# Patient Record
Sex: MALE | Race: WHITE | NOT HISPANIC OR LATINO | Employment: OTHER | URBAN - METROPOLITAN AREA
[De-identification: names, ages, dates, MRNs, and addresses within clinical notes are randomized per-mention and may not be internally consistent; named-entity substitution may affect disease eponyms.]

---

## 2021-03-20 ENCOUNTER — OFFICE VISIT (OUTPATIENT)
Dept: URGENT CARE | Facility: CLINIC | Age: 26
End: 2021-03-20
Payer: OTHER GOVERNMENT

## 2021-03-20 VITALS
TEMPERATURE: 98 F | DIASTOLIC BLOOD PRESSURE: 92 MMHG | WEIGHT: 165 LBS | BODY MASS INDEX: 25.01 KG/M2 | HEART RATE: 97 BPM | OXYGEN SATURATION: 100 % | SYSTOLIC BLOOD PRESSURE: 135 MMHG | RESPIRATION RATE: 16 BRPM | HEIGHT: 68 IN

## 2021-03-20 DIAGNOSIS — T15.91XA FB EYE, RIGHT, INITIAL ENCOUNTER: Primary | ICD-10-CM

## 2021-03-20 PROCEDURE — 99203 OFFICE O/P NEW LOW 30 MIN: CPT | Mod: S$GLB,,, | Performed by: PHYSICIAN ASSISTANT

## 2021-03-20 PROCEDURE — 99203 PR OFFICE/OUTPT VISIT, NEW, LEVL III, 30-44 MIN: ICD-10-PCS | Mod: S$GLB,,, | Performed by: PHYSICIAN ASSISTANT

## 2022-05-26 ENCOUNTER — OFFICE VISIT (OUTPATIENT)
Dept: FAMILY MEDICINE | Facility: CLINIC | Age: 27
End: 2022-05-26
Payer: OTHER GOVERNMENT

## 2022-05-26 VITALS
BODY MASS INDEX: 26.4 KG/M2 | HEART RATE: 88 BPM | WEIGHT: 174.19 LBS | DIASTOLIC BLOOD PRESSURE: 72 MMHG | SYSTOLIC BLOOD PRESSURE: 118 MMHG | OXYGEN SATURATION: 96 % | HEIGHT: 68 IN

## 2022-05-26 DIAGNOSIS — Z00.00 ANNUAL PHYSICAL EXAM: Primary | ICD-10-CM

## 2022-05-26 DIAGNOSIS — Z11.1 SCREENING-PULMONARY TB: ICD-10-CM

## 2022-05-26 PROCEDURE — 99999 PR PBB SHADOW E&M-EST. PATIENT-LVL III: CPT | Mod: PBBFAC,,, | Performed by: FAMILY MEDICINE

## 2022-05-26 PROCEDURE — 99213 OFFICE O/P EST LOW 20 MIN: CPT | Mod: PBBFAC,PN | Performed by: FAMILY MEDICINE

## 2022-05-26 PROCEDURE — 99385 PR PREVENTIVE VISIT,NEW,18-39: ICD-10-PCS | Mod: S$PBB,,, | Performed by: FAMILY MEDICINE

## 2022-05-26 PROCEDURE — 99999 PR PBB SHADOW E&M-EST. PATIENT-LVL III: ICD-10-PCS | Mod: PBBFAC,,, | Performed by: FAMILY MEDICINE

## 2022-05-26 PROCEDURE — 99385 PREV VISIT NEW AGE 18-39: CPT | Mod: S$PBB,,, | Performed by: FAMILY MEDICINE

## 2022-05-26 NOTE — PROGRESS NOTES
"EST PATIENT VISIT FAMILY MEDICINE    CC:   Chief Complaint   Patient presents with    Annual Exam     TB test       HPI: Amadeo Friedman  is a 26 y.o. male:    Patient is a 26 y.o. yo gentleman here for annual well visit.    Acute concerns: none  Employment: nursing school  Living situation: with others; parents  Pets: dog(s)  Sunscreen use: yes   Chronic health conditions: none    Hx of STI: no  STI screening: declines     Depressed mood: no    Family hx of breast, colon and/or ovarian cancer: no    ROS: Review of Systems   Constitutional: Negative for fever.   Respiratory: Negative for shortness of breath.    Cardiovascular: Negative for chest pain.       PMHX: History reviewed. No pertinent past medical history.    PSHX: History reviewed. No pertinent surgical history.    FAMHX: History reviewed. No pertinent family history.    SOCHX:   Social History     Socioeconomic History    Marital status:    Tobacco Use    Smoking status: Former Smoker    Smokeless tobacco: Former User   Substance and Sexual Activity    Alcohol use: Not Currently    Drug use: Not Currently       ALLERGIES: Review of patient's allergies indicates:  No Known Allergies    MEDS: No current outpatient medications on file.    OBJECTIVE:   Vitals:    05/26/22 0942   BP: 118/72   BP Location: Left arm   Patient Position: Sitting   BP Method: Large (Manual)   Pulse: 88   SpO2: 96%   Weight: 79 kg (174 lb 2.6 oz)   Height: 5' 8" (1.727 m)     Body mass index is 26.48 kg/m².      Physical Exam  Vitals and nursing note reviewed.   Constitutional:       Appearance: Normal appearance.   HENT:      Head: Normocephalic.   Eyes:      General:         Right eye: No discharge.         Left eye: No discharge.      Extraocular Movements: Extraocular movements intact.   Cardiovascular:      Rate and Rhythm: Normal rate and regular rhythm.      Heart sounds: Normal heart sounds.   Pulmonary:      Effort: Pulmonary effort is normal.      Breath " sounds: Normal breath sounds.   Skin:     Comments: No obvious rash on exposed skin   Neurological:      Mental Status: He is alert.   Psychiatric:         Behavior: Behavior normal.       ASSESSMENT & PLAN:    Problem List Items Addressed This Visit    None     Visit Diagnoses     Annual physical exam    -  Primary    Relevant Orders    CBC Auto Differential    Comprehensive Metabolic Panel    Lipid Panel    Screening-pulmonary TB        Relevant Orders    Quantiferon Gold TB        Preventative cares discussed and updated as needed. Lifestyle modifications discussed as needed.    Cleared to participate in clinicals     Follow up if symptoms worsen or fail to improve.      RTC/ED precautions discussed where applicable.   Encouraged patient to let me know if there are any further questions/concerns.     Advise patient/caretaker to check with insurance regarding orders to avoid unexpected fees/costs.     The patient/caretaker indicates understanding of these issues and agrees with the plan.    Dr. Binta Uribe MD  Family Medicine

## 2022-05-27 ENCOUNTER — TELEPHONE (OUTPATIENT)
Dept: FAMILY MEDICINE | Facility: CLINIC | Age: 27
End: 2022-05-27

## 2022-05-27 ENCOUNTER — PATIENT MESSAGE (OUTPATIENT)
Dept: FAMILY MEDICINE | Facility: CLINIC | Age: 27
End: 2022-05-27
Payer: OTHER GOVERNMENT

## 2022-05-27 DIAGNOSIS — Z00.00 ANNUAL PHYSICAL EXAM: Primary | ICD-10-CM

## 2022-05-27 NOTE — TELEPHONE ENCOUNTER
----- Message from Emilee Rowe sent at 5/27/2022 11:39 AM CDT -----  Regarding: Chicken Pox Vax  Type:  Needs Medical Advice    Who Called: pt  Would the patient rather a call back or a response via [facility]? call  Best Call Back Number: [phone]  Additional Information: vaccine order        82

## 2022-05-31 ENCOUNTER — PATIENT MESSAGE (OUTPATIENT)
Dept: FAMILY MEDICINE | Facility: CLINIC | Age: 27
End: 2022-05-31
Payer: OTHER GOVERNMENT

## 2022-05-31 RX ORDER — ZOSTER VACCINE RECOMBINANT, ADJUVANTED 50 MCG/0.5
KIT INTRAMUSCULAR
Qty: 1 EACH | Refills: 0 | Status: SHIPPED | OUTPATIENT
Start: 2022-05-31

## 2022-06-08 ENCOUNTER — PATIENT MESSAGE (OUTPATIENT)
Dept: FAMILY MEDICINE | Facility: CLINIC | Age: 27
End: 2022-06-08
Payer: OTHER GOVERNMENT

## 2022-06-08 DIAGNOSIS — E78.1 HYPERTRIGLYCERIDEMIA: Primary | ICD-10-CM

## 2022-06-14 ENCOUNTER — PATIENT MESSAGE (OUTPATIENT)
Dept: FAMILY MEDICINE | Facility: CLINIC | Age: 27
End: 2022-06-14
Payer: OTHER GOVERNMENT

## 2022-07-29 ENCOUNTER — TELEPHONE (OUTPATIENT)
Dept: FAMILY MEDICINE | Facility: CLINIC | Age: 27
End: 2022-07-29
Payer: OTHER GOVERNMENT

## 2022-07-29 DIAGNOSIS — E78.1 HYPERTRIGLYCERIDEMIA: Primary | ICD-10-CM

## 2022-07-29 RX ORDER — FENOFIBRATE 160 MG/1
160 TABLET ORAL DAILY
Qty: 90 TABLET | Refills: 1 | Status: SHIPPED | OUTPATIENT
Start: 2022-07-29 | End: 2023-07-29

## 2022-07-29 NOTE — TELEPHONE ENCOUNTER
----- Message from Sara Mckinley MA sent at 7/29/2022  8:37 AM CDT -----    ----- Message -----  From: Binta Uribe MD  Sent: 7/28/2022   9:51 PM CDT  To: [facility] Staff    Please call patient regarding: see my result note

## 2023-05-11 ENCOUNTER — TELEPHONE (OUTPATIENT)
Dept: FAMILY MEDICINE | Facility: CLINIC | Age: 28
End: 2023-05-11
Payer: OTHER GOVERNMENT

## 2023-05-11 DIAGNOSIS — Z11.1 SCREENING-PULMONARY TB: Primary | ICD-10-CM

## 2023-05-11 NOTE — TELEPHONE ENCOUNTER
----- Message from Mahadtrina Unger sent at 5/11/2023 10:42 AM CDT -----  Contact: pt  Type: Requesting to speak with nurse        Who Called: PT  Regarding: orders for tetanus shot and tb shot   Would the patient rather a call back or a response via [facility]? Call back  Best Call Back Number: [phone]  Additional Information:

## 2023-05-25 NOTE — PROGRESS NOTES
Subjective:         Chief Complaint: Annual Exam  HPI   Amadeo Friedman is a 27 y.o. 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      Acute concerns: none   Employment: [employer] office at [employer]   Living situation: lives with parents   Pets: none   Sunscreen use: does not use  Chronic health conditions: as listed  Reports exercise 3-4 times per week.      Hx of STI: none  STI screening: declines, currently not sexually active    Depressed mood: denies      Family hx breast, colon, prostate cancer: denies      Previous colonoscopy or colon cancer screening: n/a  Hep C screening: ordered today   HIV screening: ordered today     History reviewed. No pertinent past medical history.    History reviewed. No pertinent surgical history.    History reviewed. No pertinent family history.    Social History     Socioeconomic History    Marital status:    Tobacco Use    Smoking status: Former    Smokeless tobacco: Former   Substance and Sexual Activity    Alcohol use: Not Currently    Drug use: Not Currently       Review of Systems   Constitutional:  Negative for appetite change, fatigue and fever.   Respiratory:  Negative for cough and shortness of breath.    Cardiovascular:  Negative for chest pain and leg swelling.   Gastrointestinal:  Negative for blood in stool, diarrhea, nausea and vomiting.   Musculoskeletal:  Negative for arthralgias, gait problem and myalgias.   Skin:  Negative for color change.   Neurological:  Negative for dizziness, weakness, light-headedness and headaches.   Psychiatric/Behavioral:  Negative for agitation. The patient is not nervous/anxious.        Objective:     Vitals:    05/26/23 0825 05/26/23 0852   BP: 130/82 130/78   BP Location: Left arm    Patient Position: Sitting    BP Method: Large (Manual)    Pulse: 80    Resp:  14   SpO2: 98%    Weight:  "82.1 kg (180 lb 14.4 oz)    Height: 5' 8" (1.727 m)           Physical Exam  Vitals reviewed.   Constitutional:       Appearance: Normal appearance.   HENT:      Head: Normocephalic and atraumatic.      Right Ear: Tympanic membrane normal. There is impacted cerumen.      Left Ear: Tympanic membrane normal.      Nose: Nose normal.   Eyes:      General: No scleral icterus.     Extraocular Movements: Extraocular movements intact.   Cardiovascular:      Rate and Rhythm: Normal rate and regular rhythm.      Heart sounds: Normal heart sounds.   Pulmonary:      Effort: Pulmonary effort is normal.      Breath sounds: Normal breath sounds.   Abdominal:      General: Bowel sounds are normal.      Palpations: Abdomen is soft.   Musculoskeletal:         General: Normal range of motion.      Cervical back: Normal range of motion.      Right lower leg: No edema.      Left lower leg: No edema.   Skin:     General: Skin is warm and dry.   Neurological:      General: No focal deficit present.      Mental Status: He is alert and oriented to person, place, and time.   Psychiatric:         Mood and Affect: Mood normal.         Speech: Speech normal.         Laboratory:  CBC:  No results for input(s): WBC, RBC, HGB, HCT, PLT, MCV, MCH, MCHC in the last 2160 hours.  CMP:  No results for input(s): GLU, CALCIUM, ALBUMIN, PROT, NA, K, CO2, CL, BUN, ALKPHOS, ALT, AST, BILITOT in the last 2160 hours.    Invalid input(s): CREATININ  URINALYSIS:  No results for input(s): COLORU, CLARITYU, SPECGRAV, PHUR, PROTEINUA, GLUCOSEU, BILIRUBINCON, BLOODU, WBCU, RBCU, BACTERIA, MUCUS, NITRITE, LEUKOCYTESUR, UROBILINOGEN, HYALINECASTS in the last 2160 hours.   LIPIDS:  No results for input(s): TSH, HDL, CHOL, TRIG, LDLCALC, CHOLHDL, NONHDLCHOL, TOTALCHOLEST in the last 2160 hours.  TSH:  No results for input(s): TSH in the last 2160 hours.  A1C:  No results for input(s): HGBA1C in the last 2160 hours.    Assessment:         ICD-10-CM ICD-9-CM   1. Annual " physical exam  Z00.00 V70.0   2. Encounter for hepatitis C screening test for low risk patient  Z11.59 V73.89   3. Encounter for blood test for routine general physical examination  Z00.00 V72.62   4. Encounter for screening for human immunodeficiency virus (HIV)  Z11.4 V73.89   5. Overweight with body mass index (BMI) of 27 to 27.9 in adult  E66.3 278.02    Z68.27 V85.23   6. Hypertriglyceridemia  E78.1 272.1   7. Impacted cerumen of right ear  H61.21 380.4       Plan:       Annual physical exam  Preventative cares discussed and updated as needed.   Labs ordered. Will notify of results via my chart.   Form filled out for nursing school, scanned into chart.     Encounter for hepatitis C screening test for low risk patient  -     Hepatitis C Antibody; Future; Expected date: 05/26/2023    Encounter for blood test for routine general physical examination  -     Hepatitis C Antibody; Future; Expected date: 05/26/2023  -     HIV 1/2 Ag/Ab (4th Gen); Future; Expected date: 05/26/2023    Encounter for screening for human immunodeficiency virus (HIV)  -     HIV 1/2 Ag/Ab (4th Gen); Future; Expected date: 05/26/2023    Overweight with body mass index (BMI) of 27 to 27.9 in adult  Encouraged diet modification and to continue to exercise a few days per week as he is currently doing.     Hypertriglyceridemia  Continue medication as prescribed.     Impacted cerumen of right ear  Instructed to use OTC debrox as directed. Avoid using q-tips.       If symptoms worsen, go to ER.  If symptoms do not improve, return to clinic.   Keep appointments with all specialists.     Patient verbalizes understanding and agrees with current treatment plan.    Follow up in about 1 year (around 5/26/2024).     Patient's Medications   New Prescriptions    No medications on file   Previous Medications    FENOFIBRATE 160 MG TAB    Take 1 tablet (160 mg total) by mouth once daily. With meal    VARICELLA-ZOSTER GE-AS01B, PF, (SHINGRIX, PF,) 50 MCG/0.5 ML  INJECTION    Inject 0.5mL IM at 0 and 2-6 months   Modified Medications    No medications on file   Discontinued Medications    No medications on file         Staci Jorge NP

## 2023-05-26 ENCOUNTER — OFFICE VISIT (OUTPATIENT)
Dept: FAMILY MEDICINE | Facility: CLINIC | Age: 28
End: 2023-05-26
Payer: OTHER GOVERNMENT

## 2023-05-26 ENCOUNTER — CLINICAL SUPPORT (OUTPATIENT)
Dept: FAMILY MEDICINE | Facility: CLINIC | Age: 28
End: 2023-05-26
Payer: OTHER GOVERNMENT

## 2023-05-26 VITALS
HEIGHT: 68 IN | BODY MASS INDEX: 27.41 KG/M2 | SYSTOLIC BLOOD PRESSURE: 130 MMHG | HEART RATE: 80 BPM | RESPIRATION RATE: 14 BRPM | DIASTOLIC BLOOD PRESSURE: 78 MMHG | WEIGHT: 180.88 LBS | OXYGEN SATURATION: 98 %

## 2023-05-26 DIAGNOSIS — E78.1 HYPERTRIGLYCERIDEMIA: ICD-10-CM

## 2023-05-26 DIAGNOSIS — H61.21 IMPACTED CERUMEN OF RIGHT EAR: ICD-10-CM

## 2023-05-26 DIAGNOSIS — Z00.00 ANNUAL PHYSICAL EXAM: Primary | ICD-10-CM

## 2023-05-26 DIAGNOSIS — Z00.00 ENCOUNTER FOR BLOOD TEST FOR ROUTINE GENERAL PHYSICAL EXAMINATION: ICD-10-CM

## 2023-05-26 DIAGNOSIS — Z11.59 ENCOUNTER FOR HEPATITIS C SCREENING TEST FOR LOW RISK PATIENT: ICD-10-CM

## 2023-05-26 DIAGNOSIS — E66.3 OVERWEIGHT WITH BODY MASS INDEX (BMI) OF 27 TO 27.9 IN ADULT: ICD-10-CM

## 2023-05-26 DIAGNOSIS — Z11.4 ENCOUNTER FOR SCREENING FOR HUMAN IMMUNODEFICIENCY VIRUS (HIV): ICD-10-CM

## 2023-05-26 PROCEDURE — 90715 TDAP VACCINE 7 YRS/> IM: CPT | Mod: PBBFAC,PN

## 2023-05-26 PROCEDURE — 99395 PR PREVENTIVE VISIT,EST,18-39: ICD-10-PCS | Mod: S$PBB,,,

## 2023-05-26 PROCEDURE — 99999 PR PBB SHADOW E&M-EST. PATIENT-LVL IV: CPT | Mod: PBBFAC,,,

## 2023-05-26 PROCEDURE — 99999 PR PBB SHADOW E&M-EST. PATIENT-LVL IV: ICD-10-PCS | Mod: PBBFAC,,,

## 2023-05-26 PROCEDURE — 90471 IMMUNIZATION ADMIN: CPT | Mod: PBBFAC,PN

## 2023-05-26 PROCEDURE — 99214 OFFICE O/P EST MOD 30 MIN: CPT | Mod: PBBFAC,PN

## 2023-05-26 PROCEDURE — 99395 PREV VISIT EST AGE 18-39: CPT | Mod: S$PBB,,,

## 2024-08-01 ENCOUNTER — OFFICE VISIT (OUTPATIENT)
Dept: FAMILY MEDICINE | Facility: CLINIC | Age: 29
End: 2024-08-01
Payer: OTHER GOVERNMENT

## 2024-08-01 VITALS
DIASTOLIC BLOOD PRESSURE: 78 MMHG | HEART RATE: 67 BPM | WEIGHT: 173.94 LBS | BODY MASS INDEX: 26.36 KG/M2 | HEIGHT: 68 IN | SYSTOLIC BLOOD PRESSURE: 128 MMHG | OXYGEN SATURATION: 98 %

## 2024-08-01 DIAGNOSIS — E78.1 HYPERTRIGLYCERIDEMIA: Primary | ICD-10-CM

## 2024-08-01 DIAGNOSIS — Z11.1 SCREENING-PULMONARY TB: ICD-10-CM

## 2024-08-01 DIAGNOSIS — Z00.00 ANNUAL PHYSICAL EXAM: ICD-10-CM

## 2024-08-01 PROCEDURE — 99395 PREV VISIT EST AGE 18-39: CPT | Mod: 25,S$PBB,, | Performed by: FAMILY MEDICINE

## 2024-08-01 PROCEDURE — 99213 OFFICE O/P EST LOW 20 MIN: CPT | Mod: PBBFAC,PN | Performed by: FAMILY MEDICINE

## 2024-08-01 PROCEDURE — 99999 PR PBB SHADOW E&M-EST. PATIENT-LVL III: CPT | Mod: PBBFAC,,, | Performed by: FAMILY MEDICINE

## 2024-08-01 RX ORDER — FENOFIBRATE 160 MG/1
160 TABLET ORAL DAILY
Qty: 90 TABLET | Refills: 3 | Status: SHIPPED | OUTPATIENT
Start: 2024-08-01 | End: 2025-08-01

## 2024-08-01 NOTE — PROGRESS NOTES
PATIENT VISIT FAMILY MEDICINE    CC:   Chief Complaint   Patient presents with    Follow-up    Annual Exam       HPI: Amadeo Friedman  is a 29 y.o. male:    Patient is known to me.  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 Has not been taking fenofibrate as he wanted to see what his labs were like off of it.     PMHX: History reviewed. No pertinent past medical history.    PSHX: History reviewed. No pertinent surgical history.    FAMHX: No family history on file.    SOCHX:   Social History     Socioeconomic History    Marital status:    Tobacco Use    Smoking status: Former     Passive exposure: Past    Smokeless tobacco: Former   Substance and Sexual Activity    Alcohol use: Not Currently    Drug use: Not Currently     Social Determinants of Health     Financial Resource Strain: Low Risk  (8/1/2024)    Overall Financial Resource Strain (CARDIA)     Difficulty of Paying Living Expenses: Not hard at all   Food Insecurity: No Food Insecurity (8/1/2024)    Hunger Vital Sign     Worried About Running Out of Food in the Last Year: Never true     Ran Out of Food in the Last Year: Never true   Physical Activity: Unknown (8/1/2024)    Exercise Vital Sign     Days of Exercise per Week: 3 days   Stress: No Stress Concern Present (8/1/2024)    [employer] - Occupational Stress Questionnaire     Feeling of Stress : Not at all   Housing Stability: Unknown (8/1/2024)    Housing Stability Vital Sign     Unable to Pay for Housing in the Last Year: No       ALLERGIES: Review of patient's allergies indicates:  No Known Allergies    MEDS:   Current Outpatient Medications:     fenofibrate 160 MG Tab, Take 1 tablet (160 mg total) by mouth once daily. With meal, Disp: 90 tablet, Rfl: 3    varicella-zoster gE-AS01B, PF, (SHINGRIX, PF,) 50 mcg/0.5 mL injection, Inject 0.5mL IM at 0 and 2-6 months, Disp: 1 each, Rfl: 0    OBJECTIVE:   Vitals:    08/01/24 1453   BP: 128/78   BP  "Location: Left arm   Patient Position: Sitting   BP Method: Large (Manual)   Pulse: 67   SpO2: 98%   Weight: 78.9 kg (173 lb 15.1 oz)   Height: 5' 8" (1.727 m)     Body mass index is 26.45 kg/m².      Physical Exam  Vitals and nursing note reviewed.   Constitutional:       Appearance: Normal appearance.   HENT:      Head: Normocephalic.   Eyes:      General:         Right eye: No discharge.         Left eye: No discharge.      Extraocular Movements: Extraocular movements intact.   Cardiovascular:      Rate and Rhythm: Normal rate and regular rhythm.   Pulmonary:      Effort: Pulmonary effort is normal.      Breath sounds: Normal breath sounds.   Abdominal:      General: Abdomen is flat. Bowel sounds are normal. There is no distension.      Palpations: Abdomen is soft. There is no mass.      Tenderness: There is no abdominal tenderness. There is no guarding or rebound.      Hernia: No hernia is present.   Neurological:      Mental Status: He is alert.   Psychiatric:         Behavior: Behavior normal.           LABS:   A1C:  Recent Labs   Lab 08/02/24  0752   Hemoglobin A1C 5.3     CBC:  Recent Labs   Lab 08/02/24  0752   WBC 5.96   RBC 5.72   Hemoglobin 16.9   Hematocrit 48.7   Platelets 271   MCV 85   MCH 29.5   MCHC 34.7     CMP:  Recent Labs   Lab 08/02/24  0752   Glucose 96   Calcium 9.6   Albumin 4.0   Total Protein 7.4   Sodium 140   Potassium 4.0   CO2 27   Chloride 102   BUN 17   Creatinine 1.3   Alkaline Phosphatase 82   ALT 74 H   AST 50 H   Total Bilirubin 0.3     LIPIDS:  Recent Labs   Lab 08/02/24  0752   HDL 29 L   Cholesterol 216 H   Triglycerides 635 H   LDL Cholesterol Invalid, Trig>400.0   HDL/Cholesterol Ratio 13.4 L   Non-HDL Cholesterol 187   Total Cholesterol/HDL Ratio 7.4 H     TSH:          ASSESSMENT & PLAN:    Problem List Items Addressed This Visit    None  Visit Diagnoses       Hypertriglyceridemia    -  severe on previous labs. Would recommend he resume fenofibrate.     Relevant Orders    " Lipid Panel (Completed)    Annual physical exam    Preventative cares discussed and updated as needed. Lifestyle modifications discussed as needed.      Relevant Orders    CBC Auto Differential (Completed)    Comprehensive Metabolic Panel (Completed)    Hemoglobin A1C (Completed)    Lipid Panel (Completed)    Screening-pulmonary TB        Relevant Orders    Quantiferon Gold TB (Completed)              No follow-ups on file.      RTC/ED precautions discussed where applicable.   Encouraged patient to let me know if there are any further questions/concerns.     Advise patient/caretaker to check with insurance regarding orders to avoid unexpected fees/costs.     The patient/caretaker indicates understanding of these issues and agrees with the plan.    Dr. Binta Uribe MD  Family Medicine

## 2024-08-09 ENCOUNTER — PATIENT MESSAGE (OUTPATIENT)
Dept: FAMILY MEDICINE | Facility: CLINIC | Age: 29
End: 2024-08-09
Payer: OTHER GOVERNMENT

## 2024-08-09 DIAGNOSIS — E78.1 HYPERTRIGLYCERIDEMIA: Primary | ICD-10-CM

## 2025-08-05 ENCOUNTER — OFFICE VISIT (OUTPATIENT)
Dept: FAMILY MEDICINE | Facility: CLINIC | Age: 30
End: 2025-08-05
Payer: OTHER GOVERNMENT

## 2025-08-05 VITALS
HEIGHT: 68 IN | WEIGHT: 171.06 LBS | BODY MASS INDEX: 25.92 KG/M2 | SYSTOLIC BLOOD PRESSURE: 130 MMHG | HEART RATE: 92 BPM | OXYGEN SATURATION: 98 % | DIASTOLIC BLOOD PRESSURE: 70 MMHG

## 2025-08-05 DIAGNOSIS — Z00.00 ANNUAL PHYSICAL EXAM: Primary | ICD-10-CM

## 2025-08-05 DIAGNOSIS — Z11.1 SCREENING-PULMONARY TB: ICD-10-CM

## 2025-08-05 DIAGNOSIS — E78.1 HYPERTRIGLYCERIDEMIA: ICD-10-CM

## 2025-08-05 PROCEDURE — 99999 PR PBB SHADOW E&M-EST. PATIENT-LVL III: CPT | Mod: PBBFAC,,, | Performed by: FAMILY MEDICINE

## 2025-08-05 PROCEDURE — 99213 OFFICE O/P EST LOW 20 MIN: CPT | Mod: PBBFAC,PN | Performed by: FAMILY MEDICINE

## 2025-08-05 PROCEDURE — 99395 PREV VISIT EST AGE 18-39: CPT | Mod: 25,S$PBB,, | Performed by: FAMILY MEDICINE

## 2025-08-07 ENCOUNTER — RESULTS FOLLOW-UP (OUTPATIENT)
Dept: FAMILY MEDICINE | Facility: CLINIC | Age: 30
End: 2025-08-07
Payer: OTHER GOVERNMENT

## 2025-08-07 RX ORDER — FENOFIBRATE 160 MG/1
160 TABLET ORAL DAILY
Qty: 90 TABLET | Refills: 3 | Status: SHIPPED | OUTPATIENT
Start: 2025-08-07 | End: 2026-08-07

## 2025-08-07 NOTE — PROGRESS NOTES
"PATIENT VISIT FAMILY MEDICINE    CC:   Chief Complaint   Patient presents with    Annual Exam       HPI:    HYPERLIPIDEMIA:  He reports persistently elevated triglyceride levels, particularly during afternoon appointments which may be related to recent food intake. He previously tried fenofibrate but discontinued due to perceived lack of efficacy. He denies any family history of cholesterol issues.    DIET AND EXERCISE:  He maintains a healthy lifestyle with regular exercise. His diet consists of bran muffin, protein shake, and salad. He currently takes creatine and protein powder as dietary supplements.    SOCIAL HISTORY:  He denies alcohol, smoking, and drug use.          MEDS: Current Medications[1]    OBJECTIVE:   Vitals:    08/05/25 1427   BP: 130/70   BP Location: Left arm   Patient Position: Sitting   Pulse: 92   SpO2: 98%   Weight: 77.6 kg (171 lb 1.2 oz)   Height: 5' 8" (1.727 m)     Body mass index is 26.01 kg/m².      Physical Exam    Constitutional: Normal appearance.  HENT: Normocephalic.  Eyes: No discharge bilaterally. Extraocular movements intact.  Cardiovascular: Normal rate and regular rhythm. Normal heart sounds.  Pulmonary: Pulmonary effort is normal. Normal breath sounds.  Abdominal: Abdomen is flat. Bowel sounds are normal. No distension. Abdomen is soft. No mass. No tenderness. No guarding. No rebound. No hernia is present.  Skin: Warm. Dry.  Neurological: Alert.  Psychiatric: Behavior normal.          LABS:   A1C:  Recent Labs   Lab 08/06/25  0836   Hemoglobin A1c 5.4     CBC:  Recent Labs   Lab 08/06/25  0836   WBC 5.45   RBC 5.71   HGB 17.0   HCT 49.6   Platelet Count 245   MCV 87   MCH 29.8   MCHC 34.3     CMP:  Recent Labs   Lab 08/06/25  0836   Glucose 95   Calcium 9.6   Albumin 4.6   Protein Total 7.4   Sodium 142   Potassium 4.3   CO2 29   Chloride 104   BUN 16   Creatinine 1.3   ALP 72   ALT 41   AST 29   Bilirubin Total 0.4     LIPIDS:  Recent Labs   Lab 08/06/25  0836   HDL " Cholesterol 36 L   Cholesterol Total 230 H   Triglyceride 330 H   LDL Cholesterol 128.0   HDL/Cholesterol Ratio 15.7 L   Non HDL Cholesterol 194   Cholesterol/HDL Ratio 6.4 H     TSH:          ASSESSMENT & PLAN:    Problem List Items Addressed This Visit    None  Visit Diagnoses         Annual physical exam    -  Preventative cares discussed and updated as needed. Lifestyle modifications discussed as needed.      Relevant Orders    CBC Auto Differential (Completed)    Comprehensive Metabolic Panel (Completed)    Lipid Panel (Completed)    Hemoglobin A1C (Completed)      Hypertriglyceridemia    repeat fasting labs. Recommend he resume fenofibrate if levels still elevated despite diet/lifestyle changes.       Screening-pulmonary TB        Relevant Orders    Quantiferon Gold TB (Completed)     Hypertriglyceridemia   - Monitored the patient's triglycerides levels which are consistently high, reaching levels of 600, despite dietary changes, regular exercise, and 9-pound weight loss since last year.  - Assessed the potential genetic or idiopathic nature of high lipid levels and discussed risk of pancreatitis associated with very high triglyceride levels.  - Noted that the patient was previously prescribed fenofibrate but discontinued it due to perceived lack of efficacy.  - Ordered fasting labs including triglyceride levels.    Follow up in about 1 year (around 8/5/2026) for annual.    RTC/ED precautions discussed where applicable.   Encouraged patient to let me know if there are any further questions/concerns.     Advise patient/caretaker to check with insurance regarding orders to avoid unexpected fees/costs.     The patient/caretaker indicates understanding of these issues and agrees with the plan.    This note was generated with the assistance of ambient listening technology. I attest to having reviewed and edited the generated note for accuracy, though some syntax or spelling errors may persist. Please contact the  author of this note for any clarification.      Dr. Binta Uribe MD  Family Medicine       [1]   Current Outpatient Medications:     fenofibrate 160 MG Tab, Take 1 tablet (160 mg total) by mouth once daily. With meal, Disp: 90 tablet, Rfl: 3    varicella-zoster gE-AS01B, PF, (SHINGRIX, PF,) 50 mcg/0.5 mL injection, Inject 0.5mL IM at 0 and 2-6 months, Disp: 1 each, Rfl: 0

## 2025-08-14 RX ORDER — FENOFIBRATE 160 MG/1
160 TABLET ORAL DAILY
Qty: 90 TABLET | Refills: 3 | Status: SHIPPED | OUTPATIENT
Start: 2025-08-14 | End: 2026-08-14